# Patient Record
Sex: MALE | Race: WHITE | Employment: PART TIME | URBAN - METROPOLITAN AREA
[De-identification: names, ages, dates, MRNs, and addresses within clinical notes are randomized per-mention and may not be internally consistent; named-entity substitution may affect disease eponyms.]

---

## 2022-04-14 ENCOUNTER — HOSPITAL ENCOUNTER (OUTPATIENT)
Dept: PULMONOLOGY | Age: 22
Discharge: HOME | End: 2022-04-14
Payer: COMMERCIAL

## 2022-04-14 DIAGNOSIS — R06.00 ACUTE DYSPNEA: ICD-10-CM

## 2022-04-14 PROCEDURE — 94729 DIFFUSING CAPACITY: CPT

## 2022-04-14 PROCEDURE — 94726 PLETHYSMOGRAPHY LUNG VOLUMES: CPT

## 2022-04-14 PROCEDURE — 94060 EVALUATION OF WHEEZING: CPT

## 2022-04-15 NOTE — PROCEDURES
[id number] [address]                               PULMONARY FUNCTION    PATIENT NAME: Caretha Goldmann                 :        2000  MED REC NO:   [id number]                            ROOM:  ACCOUNT NO:   [de-identified]                           ADMIT DATE: 2022  PROVIDER:     Sue Noe MD    DATE OF PROCEDURE:  2022    DIAGNOSIS:  Acute dyspnea. This is a full PFT with a short exhalation time on spirometry,  especially postbronchodilator. Spirometry nonetheless revealed normal  FVC, normal FEV1, and normal FEV1/FVC ratio without bronchodilator  response. The best FEV1 was the prebronchodilator value of 5.01 liters,  which is 107% of predicted. Lung volumes revealed normal total lung capacity and thoracic gas  volume. RV/TLC ratio was normal, prebronchodilator. Diffusion capacity was supernormal.    IMPRESSION:  Normal PFT without bronchodilator response.         Helen Dong MD    D: 2022 15:01:57       T: 2022 15:04:43     [email or web address]  Job#: [id number]     Doc#: [id number]    CC:  Anibal Cantu